# Patient Record
(demographics unavailable — no encounter records)

---

## 2025-04-07 NOTE — ASSESSMENT
[FreeTextEntry1] : Loida is a pleasant 43 year-old female who presents to discuss obesity medicine options today.    We discussed medical weight loss management in detail. We discussed specific medications currently FDA approved including both oral (Phentermine, Contrave, Qsymia) and injectable medications (GLP-1 RA). We reviewed indications, MOA, safety/contraindications, efficacy, storage, dosage, administration, missed doses, dose titration, availability of medication, side effects/adverse reactions and insurance coverage. Patient is interested in pursuing GLP-RA therapy at this time.   Patient will have blood work done at a [facility] lab for my review.  In the interim, I will try to obtain authorization for Zepbound once weekly GLP-1 injection. I will expedite authorization request via [employer] and contact patient once authorization determination has been reached. In the interim patient will continue to increase his physical activity as tolerated and I recommend at least 150 minutes/week of both cardiovascular and resistance training methods.     Patient will contact me monthly to discuss medication tolerance and dose escalation and we will connect via  or in person office visit every 2 months for follow-up.   I will check blood work every 3 months while on GLP-1 medication. Patient agrees with current plan.   We also discussed focusing on high quality lean proteins decrease carbohydrates and decrease fats. Patient verbalized understanding. I will also refer patient to our practice RD, Jadiel Nguyen, for juliano/macro nutritional support.   Again, I have discussed initiating ** therapy. All risks and benefits have been discussed with the patient. Risks include but are not limited to nausea, vomiting, constipation, diarrhea, and GI upset. Contraindications include Pancreatitis, MENS type 2 and medullary thyroid cancer, and pregnancy. Pt. verbalize understanding and wishes to proceed with medical therapy. Instructions for use provided via office demonstration.   We discussed monthly dose escalation until patient is pleased with results, developed a steady diet plan and exercise regimen - then will slowly titrate downward based on comfortability and consider long term maintenance dose.   All questions answered to patient's satisfaction.   Appreciate referral   
Mahad: cleared by psych, will d/c home.

## 2025-04-07 NOTE — HISTORY OF PRESENT ILLNESS
[FreeTextEntry1] : Loida is a 43 year old female who presents for initial obesity medicine consultation.   Her obesity is refractory to diet and exercise efforts.   Patient is interested in exploring medical weight loss options.  Patient reports she was referred to the bariatric office by her PCP. Patient reports being on antihypertensive medication previously but after a stress test and seeing her BP improve, her PCP instructed her to discontinue the medication. Patient reports she has not tried any lifestyle programs, medications for weight loss, nor diets. Patient states she exercises using a stationary bike for 30 minutes every morning.   Allergies: NKDA  Current weight (lb): 191 (self-reported, recalled value from 2 months ago) Current BMI (kg/m2): 30.8 (based on self-reported height of 66") Weight loss goal: 51 lb (27%)   PMHx: Patient denied personal or family h/o c-cell thyroid cancer and personal h/o pancreatitis or gallbladder disease  Medication list: NONE  Assessment: - Patient has a BMI 30-34.9 kg/m2 with no weight-related co-morbidity - No Drug-Drug interactions or contraindications noted - Patient's insurance covers all weight loss injectable medications via prior authorization - Patient is agreeable to fill at [facility] [place]   Recommendation: -Initiate tirzepatide (Zepbound) 2.5 mg SQ once weekly